# Patient Record
Sex: FEMALE | Race: OTHER
[De-identification: names, ages, dates, MRNs, and addresses within clinical notes are randomized per-mention and may not be internally consistent; named-entity substitution may affect disease eponyms.]

---

## 2019-02-13 ENCOUNTER — HOSPITAL ENCOUNTER (EMERGENCY)
Dept: HOSPITAL 12 - ER | Age: 24
LOS: 1 days | Discharge: HOME | End: 2019-02-14
Payer: COMMERCIAL

## 2019-02-13 VITALS — HEIGHT: 63 IN | WEIGHT: 120 LBS | BODY MASS INDEX: 21.26 KG/M2

## 2019-02-13 DIAGNOSIS — R09.81: ICD-10-CM

## 2019-02-13 DIAGNOSIS — E86.0: Primary | ICD-10-CM

## 2019-02-13 LAB
ALP SERPL-CCNC: 92 U/L (ref 50–136)
ALT SERPL W/O P-5'-P-CCNC: 18 U/L (ref 14–59)
APPEARANCE UR: CLEAR
AST SERPL-CCNC: 21 U/L (ref 15–37)
BASOPHILS # BLD AUTO: 0 K/UL (ref 0–8)
BASOPHILS NFR BLD AUTO: 0.3 % (ref 0–2)
BILIRUB DIRECT SERPL-MCNC: 0.1 MG/DL (ref 0–0.2)
BILIRUB SERPL-MCNC: 0.4 MG/DL (ref 0.2–1)
BILIRUB UR QL STRIP: (no result)
BUN SERPL-MCNC: 9 MG/DL (ref 7–18)
CHLORIDE SERPL-SCNC: 100 MMOL/L (ref 98–107)
CO2 SERPL-SCNC: 28 MMOL/L (ref 21–32)
COLOR UR: (no result)
CREAT SERPL-MCNC: 0.8 MG/DL (ref 0.6–1.3)
DEPRECATED SQUAMOUS URNS QL MICRO: (no result) /HPF
EOSINOPHIL # BLD AUTO: 0.1 K/UL (ref 0–0.7)
EOSINOPHIL NFR BLD AUTO: 0.6 % (ref 0–7)
GLUCOSE SERPL-MCNC: 97 MG/DL (ref 74–106)
GLUCOSE UR STRIP-MCNC: (no result) MG/DL
HCT VFR BLD AUTO: 42.3 % (ref 31.2–41.9)
HGB BLD-MCNC: 14.2 G/DL (ref 10.9–14.3)
HGB UR QL STRIP: NEGATIVE
KETONES UR STRIP-MCNC: (no result) MG/DL
LEUKOCYTE ESTERASE UR QL STRIP: NEGATIVE
LYMPHOCYTES # BLD AUTO: 2.3 K/UL (ref 20–40)
LYMPHOCYTES NFR BLD AUTO: 17.5 % (ref 20.5–51.5)
MCH RBC QN AUTO: 31 UUG (ref 24.7–32.8)
MCHC RBC AUTO-ENTMCNC: 34 G/DL (ref 32.3–35.6)
MCV RBC AUTO: 92.4 FL (ref 75.5–95.3)
MONOCYTES # BLD AUTO: 0.8 K/UL (ref 2–10)
MONOCYTES NFR BLD AUTO: 6 % (ref 0–11)
MUCOUS THREADS URNS QL MICRO: (no result) /LPF
NEUTROPHILS # BLD AUTO: 10 K/UL (ref 1.8–8.9)
NEUTROPHILS NFR BLD AUTO: 75.6 % (ref 38.5–71.5)
NITRITE UR QL STRIP: POSITIVE
PH UR STRIP: 7 [PH] (ref 5–8)
PLATELET # BLD AUTO: 341 K/UL (ref 179–408)
POTASSIUM SERPL-SCNC: 3.9 MMOL/L (ref 3.5–5.1)
RBC # BLD AUTO: 4.57 MIL/UL (ref 3.63–4.92)
RBC #/AREA URNS HPF: (no result) /HPF (ref 0–3)
SP GR UR STRIP: 1.02 (ref 1–1.03)
UROBILINOGEN UR STRIP-MCNC: 1 E.U./DL
WBC # BLD AUTO: 13.2 K/UL (ref 3.8–11.8)
WBC #/AREA URNS HPF: (no result) /HPF
WBC #/AREA URNS HPF: (no result) /HPF (ref 0–3)
WS STN SPEC: 9.6 G/DL (ref 6.4–8.2)

## 2019-02-13 PROCEDURE — 81001 URINALYSIS AUTO W/SCOPE: CPT

## 2019-02-13 PROCEDURE — 99284 EMERGENCY DEPT VISIT MOD MDM: CPT

## 2019-02-13 PROCEDURE — 83690 ASSAY OF LIPASE: CPT

## 2019-02-13 PROCEDURE — 93005 ELECTROCARDIOGRAM TRACING: CPT

## 2019-02-13 PROCEDURE — 87086 URINE CULTURE/COLONY COUNT: CPT

## 2019-02-13 PROCEDURE — 96361 HYDRATE IV INFUSION ADD-ON: CPT

## 2019-02-13 PROCEDURE — 96374 THER/PROPH/DIAG INJ IV PUSH: CPT

## 2019-02-13 PROCEDURE — 86403 PARTICLE AGGLUT ANTBDY SCRN: CPT

## 2019-02-13 PROCEDURE — 80048 BASIC METABOLIC PNL TOTAL CA: CPT

## 2019-02-13 PROCEDURE — 86308 HETEROPHILE ANTIBODY SCREEN: CPT

## 2019-02-13 PROCEDURE — 85025 COMPLETE CBC W/AUTO DIFF WBC: CPT

## 2019-02-13 PROCEDURE — 36415 COLL VENOUS BLD VENIPUNCTURE: CPT

## 2019-02-13 PROCEDURE — 84484 ASSAY OF TROPONIN QUANT: CPT

## 2019-02-13 PROCEDURE — 85730 THROMBOPLASTIN TIME PARTIAL: CPT

## 2019-02-13 PROCEDURE — 87040 BLOOD CULTURE FOR BACTERIA: CPT

## 2019-02-13 PROCEDURE — 96375 TX/PRO/DX INJ NEW DRUG ADDON: CPT

## 2019-02-13 PROCEDURE — 71045 X-RAY EXAM CHEST 1 VIEW: CPT

## 2019-02-13 PROCEDURE — 83605 ASSAY OF LACTIC ACID: CPT

## 2019-02-13 PROCEDURE — 87070 CULTURE OTHR SPECIMN AEROBIC: CPT

## 2019-02-13 PROCEDURE — 80076 HEPATIC FUNCTION PANEL: CPT

## 2019-02-13 PROCEDURE — 84702 CHORIONIC GONADOTROPIN TEST: CPT

## 2019-02-13 PROCEDURE — A4663 DIALYSIS BLOOD PRESSURE CUFF: HCPCS

## 2019-02-13 PROCEDURE — 87400 INFLUENZA A/B EACH AG IA: CPT

## 2019-02-14 VITALS — DIASTOLIC BLOOD PRESSURE: 79 MMHG | SYSTOLIC BLOOD PRESSURE: 115 MMHG

## 2019-02-14 LAB — HETEROPH AB SER QL LA: NEGATIVE

## 2019-02-14 NOTE — NUR
Patient discharged to home in stable conditon.  Written and verbal after care 
instructions given. 

Patient verbalizes understanding of instructions. Pt ambulated out of ER with 
steady gait, no acute signs of distress, VSS, all belongings taken. 


-------------------------------------------------------------------------------

Addendum: 02/14/19 at 0022 by GEORGE

-------------------------------------------------------------------------------

IV site discontinued.

## 2021-08-08 ENCOUNTER — HOSPITAL ENCOUNTER (INPATIENT)
Dept: HOSPITAL 87 - 8 EST LDRP | Age: 26
LOS: 3 days | Discharge: HOME | DRG: 560 | End: 2021-08-11
Attending: OBSTETRICS & GYNECOLOGY | Admitting: OBSTETRICS & GYNECOLOGY
Payer: COMMERCIAL

## 2021-08-08 VITALS — BODY MASS INDEX: 22.15 KG/M2 | WEIGHT: 125 LBS | HEIGHT: 63 IN

## 2021-08-08 DIAGNOSIS — D64.9: ICD-10-CM

## 2021-08-08 DIAGNOSIS — G40.909: ICD-10-CM

## 2021-08-08 DIAGNOSIS — Z3A.39: ICD-10-CM

## 2021-08-08 DIAGNOSIS — F16.10: ICD-10-CM

## 2021-08-08 DIAGNOSIS — Z20.822: ICD-10-CM

## 2021-08-08 DIAGNOSIS — R74.8: ICD-10-CM

## 2021-08-08 DIAGNOSIS — F11.10: ICD-10-CM

## 2021-08-08 DIAGNOSIS — E87.1: ICD-10-CM

## 2021-08-08 LAB
AMPHETAMINES UR QL SCN: NEGATIVE
APPEARANCE UR: CLEAR
APTT PPP: 28.8 SEC (ref 23.4–31)
BARBITURATES UR QL SCN: NEGATIVE
BASOPHILS NFR BLD AUTO: 0.2 % (ref 0–2)
BENZODIAZ UR QL SCN: NEGATIVE
BZE UR QL SCN: NEGATIVE
CANNABINOIDS UR QL SCN: NEGATIVE
CHLORIDE SERPL-SCNC: 108 MEQ/L (ref 98–107)
COLOR UR: (no result)
EOSINOPHIL NFR BLD AUTO: 0.3 % (ref 0–5)
ERYTHROCYTE [DISTWIDTH] IN BLOOD BY AUTOMATED COUNT: 15.4 % (ref 11.6–14.6)
FIBRINOGEN PPP-MCNC: 663 MG/DL (ref 200–400)
HBV SURFACE AB SER-ACNC: NEGATIVE M[IU]/ML
HCT VFR BLD AUTO: 36.2 % (ref 36–48)
HGB BLD-MCNC: 11.5 G/DL (ref 12–16)
HGB UR QL STRIP: (no result)
INR PPP: 0.9
KETONES UR STRIP-MCNC: (no result) MG/DL
LEUKOCYTE ESTERASE UR QL STRIP: (no result)
LYMPHOCYTES NFR BLD AUTO: 20.6 % (ref 20–50)
MCH RBC QN AUTO: 27.4 PG (ref 28–32)
MCV RBC AUTO: 86.1 FL (ref 81–99)
METHADONE UR QL SCN: (no result)
MONOCYTES NFR BLD AUTO: 5.5 % (ref 2–8)
NEUTROPHILS NFR BLD AUTO: 73.4 % (ref 40–76)
NITRITE UR QL STRIP: NEGATIVE
OPIATES UR QL SCN: NEGATIVE
PCP UR QL SCN: NEGATIVE
PH UR STRIP: 6 [PH] (ref 4.5–8)
PLATELET # BLD AUTO: 259 X1000/UL (ref 130–400)
PMV BLD AUTO: 11.5 FL (ref 7.4–10.4)
PROT UR QL STRIP: (no result)
PROTHROMBIN TIME: 9.8 SEC (ref 9.6–11)
RBC # BLD AUTO: 4.21 MILL/UL (ref 4.2–5.4)
SP GR UR STRIP: 1.04 (ref 1–1.03)
UROBILINOGEN UR STRIP-MCNC: 1 E.U./DL (ref 0.2–1)

## 2021-08-08 PROCEDURE — 86592 SYPHILIS TEST NON-TREP QUAL: CPT

## 2021-08-08 PROCEDURE — 81003 URINALYSIS AUTO W/O SCOPE: CPT

## 2021-08-08 PROCEDURE — 80305 DRUG TEST PRSMV DIR OPT OBS: CPT

## 2021-08-08 PROCEDURE — 86703 HIV-1/HIV-2 1 RESULT ANTBDY: CPT

## 2021-08-08 PROCEDURE — 85379 FIBRIN DEGRADATION QUANT: CPT

## 2021-08-08 PROCEDURE — 87426 SARSCOV CORONAVIRUS AG IA: CPT

## 2021-08-08 PROCEDURE — 85025 COMPLETE CBC W/AUTO DIFF WBC: CPT

## 2021-08-08 PROCEDURE — 80358 DRUG SCREENING METHADONE: CPT

## 2021-08-08 PROCEDURE — 86850 RBC ANTIBODY SCREEN: CPT

## 2021-08-08 PROCEDURE — 36415 COLL VENOUS BLD VENIPUNCTURE: CPT

## 2021-08-08 PROCEDURE — 84550 ASSAY OF BLOOD/URIC ACID: CPT

## 2021-08-08 PROCEDURE — 80053 COMPREHEN METABOLIC PANEL: CPT

## 2021-08-08 PROCEDURE — 86900 BLOOD TYPING SEROLOGIC ABO: CPT

## 2021-08-08 PROCEDURE — 80359 METHYLENEDIOXYAMPHETAMINES: CPT

## 2021-08-08 PROCEDURE — 87340 HEPATITIS B SURFACE AG IA: CPT

## 2021-08-08 PROCEDURE — 86762 RUBELLA ANTIBODY: CPT

## 2021-08-08 PROCEDURE — 76805 OB US >/= 14 WKS SNGL FETUS: CPT

## 2021-08-08 PROCEDURE — 85384 FIBRINOGEN ACTIVITY: CPT

## 2021-08-08 PROCEDURE — 83735 ASSAY OF MAGNESIUM: CPT

## 2021-08-08 RX ADMIN — MAGNESIUM SULFATE IN WATER SCH MLS/HR: 40 INJECTION, SOLUTION INTRAVENOUS at 21:55

## 2021-08-08 RX ADMIN — METHADONE HYDROCHLORIDE SCH MG: 5 TABLET ORAL at 13:51

## 2021-08-08 RX ADMIN — PENICILLIN G POTASSIUM SCH MLS/HR: 5000000 POWDER, FOR SOLUTION INTRAMUSCULAR; INTRAPLEURAL; INTRATHECAL; INTRAVENOUS at 23:30

## 2021-08-08 RX ADMIN — PENICILLIN G POTASSIUM SCH MLS/HR: 5000000 POWDER, FOR SOLUTION INTRAMUSCULAR; INTRAPLEURAL; INTRATHECAL; INTRAVENOUS at 19:51

## 2021-08-08 RX ADMIN — Medication SCH MLS/HR: at 15:26

## 2021-08-08 RX ADMIN — Medication SCH MLS/HR: at 13:30

## 2021-08-08 RX ADMIN — Medication PRN MG: at 22:07

## 2021-08-08 RX ADMIN — Medication PRN MG: at 15:55

## 2021-08-08 RX ADMIN — MAGNESIUM SULFATE IN WATER SCH MLS/HR: 40 INJECTION, SOLUTION INTRAVENOUS at 12:15

## 2021-08-08 RX ADMIN — Medication SCH MLS/HR: at 15:13

## 2021-08-09 VITALS — DIASTOLIC BLOOD PRESSURE: 61 MMHG | SYSTOLIC BLOOD PRESSURE: 103 MMHG

## 2021-08-09 VITALS — DIASTOLIC BLOOD PRESSURE: 85 MMHG | SYSTOLIC BLOOD PRESSURE: 134 MMHG

## 2021-08-09 VITALS — SYSTOLIC BLOOD PRESSURE: 137 MMHG | DIASTOLIC BLOOD PRESSURE: 89 MMHG

## 2021-08-09 LAB
APTT PPP: 26.1 SEC (ref 23.4–31)
BASOPHILS NFR BLD AUTO: 0.1 % (ref 0–2)
CHLORIDE SERPL-SCNC: 102 MEQ/L (ref 98–107)
D DIMER PPP FEU-MCNC: 2.8 MG/L FEU (ref ?–0.5)
EOSINOPHIL NFR BLD AUTO: 0 % (ref 0–5)
ERYTHROCYTE [DISTWIDTH] IN BLOOD BY AUTOMATED COUNT: 15.1 % (ref 11.6–14.6)
FIBRINOGEN PPP-MCNC: 689 MG/DL (ref 200–400)
HCT VFR BLD AUTO: 36.5 % (ref 36–48)
HGB BLD-MCNC: 11.6 G/DL (ref 12–16)
INR PPP: 0.9
LYMPHOCYTES NFR BLD AUTO: 9.2 % (ref 20–50)
MCH RBC QN AUTO: 27.5 PG (ref 28–32)
MCV RBC AUTO: 86.3 FL (ref 81–99)
MONOCYTES NFR BLD AUTO: 5.5 % (ref 2–8)
NEUTROPHILS NFR BLD AUTO: 85.2 % (ref 40–76)
PLATELET # BLD AUTO: 266 X1000/UL (ref 130–400)
PMV BLD AUTO: 11.3 FL (ref 7.4–10.4)
PROTHROMBIN TIME: 9.4 SEC (ref 9.6–11)
RBC # BLD AUTO: 4.23 MILL/UL (ref 4.2–5.4)

## 2021-08-09 PROCEDURE — 0KQM0ZZ REPAIR PERINEUM MUSCLE, OPEN APPROACH: ICD-10-PCS | Performed by: OBSTETRICS & GYNECOLOGY

## 2021-08-09 PROCEDURE — 3E0R3BZ INTRODUCTION OF ANESTHETIC AGENT INTO SPINAL CANAL, PERCUTANEOUS APPROACH: ICD-10-PCS

## 2021-08-09 PROCEDURE — 00HU33Z INSERTION OF INFUSION DEVICE INTO SPINAL CANAL, PERCUTANEOUS APPROACH: ICD-10-PCS

## 2021-08-09 RX ADMIN — SIMETHICONE CHEW TAB 80 MG SCH MG: 80 TABLET ORAL at 21:30

## 2021-08-09 RX ADMIN — PENICILLIN G POTASSIUM SCH MLS/HR: 5000000 POWDER, FOR SOLUTION INTRAMUSCULAR; INTRAPLEURAL; INTRATHECAL; INTRAVENOUS at 03:36

## 2021-08-09 RX ADMIN — PHENOBARBITAL SCH MG: 32.4 TABLET ORAL at 10:25

## 2021-08-09 RX ADMIN — ALUMINUM HYDROXIDE, MAGNESIUM HYDROXIDE, AND SIMETHICONE SCH ML: 200; 200; 20 SUSPENSION ORAL at 21:29

## 2021-08-09 RX ADMIN — METHADONE HYDROCHLORIDE SCH MG: 5 TABLET ORAL at 02:04

## 2021-08-09 RX ADMIN — PHENOBARBITAL SCH MG: 32.4 TABLET ORAL at 21:29

## 2021-08-09 RX ADMIN — ALUMINUM HYDROXIDE, MAGNESIUM HYDROXIDE, AND SIMETHICONE SCH ML: 200; 200; 20 SUSPENSION ORAL at 10:25

## 2021-08-09 RX ADMIN — DOCUSATE SODIUM SCH MG: 100 CAPSULE, LIQUID FILLED ORAL at 21:29

## 2021-08-10 VITALS — DIASTOLIC BLOOD PRESSURE: 76 MMHG | SYSTOLIC BLOOD PRESSURE: 117 MMHG

## 2021-08-10 VITALS — DIASTOLIC BLOOD PRESSURE: 69 MMHG | SYSTOLIC BLOOD PRESSURE: 107 MMHG

## 2021-08-10 VITALS — DIASTOLIC BLOOD PRESSURE: 68 MMHG | SYSTOLIC BLOOD PRESSURE: 128 MMHG

## 2021-08-10 VITALS — DIASTOLIC BLOOD PRESSURE: 82 MMHG | SYSTOLIC BLOOD PRESSURE: 124 MMHG

## 2021-08-10 LAB
BASOPHILS NFR BLD AUTO: 0.1 % (ref 0–2)
EOSINOPHIL NFR BLD AUTO: 0 % (ref 0–5)
ERYTHROCYTE [DISTWIDTH] IN BLOOD BY AUTOMATED COUNT: 15.3 % (ref 11.6–14.6)
HCT VFR BLD AUTO: 19.8 % (ref 36–48)
HGB BLD-MCNC: 6.3 G/DL (ref 12–16)
LYMPHOCYTES NFR BLD AUTO: 11.2 % (ref 20–50)
MCH RBC QN AUTO: 27.5 PG (ref 28–32)
MCV RBC AUTO: 86.9 FL (ref 81–99)
MONOCYTES NFR BLD AUTO: 5.9 % (ref 2–8)
NEUTROPHILS NFR BLD AUTO: 82.8 % (ref 40–76)
PLATELET # BLD AUTO: 328 X1000/UL (ref 130–400)
PMV BLD AUTO: 10.5 FL (ref 7.4–10.4)
RBC # BLD AUTO: 2.28 MILL/UL (ref 4.2–5.4)

## 2021-08-10 RX ADMIN — ALUMINUM HYDROXIDE, MAGNESIUM HYDROXIDE, AND SIMETHICONE SCH ML: 200; 200; 20 SUSPENSION ORAL at 21:00

## 2021-08-10 RX ADMIN — SIMETHICONE CHEW TAB 80 MG SCH MG: 80 TABLET ORAL at 13:00

## 2021-08-10 RX ADMIN — DOCUSATE SODIUM SCH MG: 100 CAPSULE, LIQUID FILLED ORAL at 21:00

## 2021-08-10 RX ADMIN — SIMETHICONE CHEW TAB 80 MG SCH MG: 80 TABLET ORAL at 21:10

## 2021-08-10 RX ADMIN — PHENOBARBITAL SCH MG: 32.4 TABLET ORAL at 16:45

## 2021-08-10 RX ADMIN — METHADONE HYDROCHLORIDE SCH MG: 5 TABLET ORAL at 01:39

## 2021-08-10 RX ADMIN — SIMETHICONE CHEW TAB 80 MG SCH MG: 80 TABLET ORAL at 09:00

## 2021-08-10 RX ADMIN — IRON SUPPLEMENT SCH MG: 325 TABLET ORAL at 16:45

## 2021-08-10 RX ADMIN — METHADONE HYDROCHLORIDE SCH MG: 5 TABLET ORAL at 13:30

## 2021-08-10 RX ADMIN — ALUMINUM HYDROXIDE, MAGNESIUM HYDROXIDE, AND SIMETHICONE SCH ML: 200; 200; 20 SUSPENSION ORAL at 09:00

## 2021-08-10 RX ADMIN — IRON SUPPLEMENT SCH MG: 325 TABLET ORAL at 07:30

## 2021-08-10 RX ADMIN — IRON SUPPLEMENT SCH MG: 325 TABLET ORAL at 12:30

## 2021-08-10 RX ADMIN — ALUMINUM HYDROXIDE, MAGNESIUM HYDROXIDE, AND SIMETHICONE SCH ML: 200; 200; 20 SUSPENSION ORAL at 13:00

## 2021-08-10 RX ADMIN — SIMETHICONE CHEW TAB 80 MG SCH MG: 80 TABLET ORAL at 16:53

## 2021-08-10 RX ADMIN — ALUMINUM HYDROXIDE, MAGNESIUM HYDROXIDE, AND SIMETHICONE SCH ML: 200; 200; 20 SUSPENSION ORAL at 16:54

## 2021-08-10 RX ADMIN — PHENOBARBITAL SCH MG: 32.4 TABLET ORAL at 09:57

## 2021-08-11 VITALS — DIASTOLIC BLOOD PRESSURE: 86 MMHG | SYSTOLIC BLOOD PRESSURE: 127 MMHG

## 2021-08-11 VITALS — SYSTOLIC BLOOD PRESSURE: 124 MMHG | DIASTOLIC BLOOD PRESSURE: 81 MMHG

## 2021-08-11 RX ADMIN — ALUMINUM HYDROXIDE, MAGNESIUM HYDROXIDE, AND SIMETHICONE SCH ML: 200; 200; 20 SUSPENSION ORAL at 08:16

## 2021-08-11 RX ADMIN — IRON SUPPLEMENT SCH MG: 325 TABLET ORAL at 08:15

## 2021-08-11 RX ADMIN — METHADONE HYDROCHLORIDE SCH MG: 5 TABLET ORAL at 00:58

## 2021-08-11 RX ADMIN — PHENOBARBITAL SCH MG: 32.4 TABLET ORAL at 08:23
